# Patient Record
Sex: FEMALE | Race: WHITE
[De-identification: names, ages, dates, MRNs, and addresses within clinical notes are randomized per-mention and may not be internally consistent; named-entity substitution may affect disease eponyms.]

---

## 2018-04-03 ENCOUNTER — HOSPITAL ENCOUNTER (EMERGENCY)
Dept: HOSPITAL 45 - C.EDB | Age: 18
Discharge: HOME | End: 2018-04-03
Payer: COMMERCIAL

## 2018-04-03 VITALS
WEIGHT: 106.7 LBS | BODY MASS INDEX: 18.91 KG/M2 | HEIGHT: 62.99 IN | WEIGHT: 106.7 LBS | HEIGHT: 62.99 IN | BODY MASS INDEX: 18.91 KG/M2

## 2018-04-03 VITALS — TEMPERATURE: 97.34 F

## 2018-04-03 VITALS — OXYGEN SATURATION: 98 % | HEART RATE: 78 BPM | SYSTOLIC BLOOD PRESSURE: 110 MMHG | DIASTOLIC BLOOD PRESSURE: 64 MMHG

## 2018-04-03 DIAGNOSIS — R11.2: ICD-10-CM

## 2018-04-03 DIAGNOSIS — R10.13: Primary | ICD-10-CM

## 2018-04-03 LAB
ALBUMIN SERPL-MCNC: 4.1 GM/DL (ref 3.4–5)
ALP SERPL-CCNC: 58 U/L (ref 45–117)
ALT SERPL-CCNC: 22 U/L (ref 12–78)
AST SERPL-CCNC: 13 U/L (ref 15–37)
BASOPHILS # BLD: 0.03 K/UL (ref 0–0.2)
BASOPHILS NFR BLD: 0.3 %
BUN SERPL-MCNC: 9 MG/DL (ref 7–18)
CALCIUM SERPL-MCNC: 9.3 MG/DL (ref 8.5–10.1)
CO2 SERPL-SCNC: 24 MMOL/L (ref 21–32)
CREAT SERPL-MCNC: 0.83 MG/DL (ref 0.6–1.2)
EOS ABS #: 0.29 K/UL (ref 0–0.5)
EOSINOPHIL NFR BLD AUTO: 237 K/UL (ref 130–400)
GLUCOSE SERPL-MCNC: 96 MG/DL (ref 70–99)
HCT VFR BLD CALC: 43.5 % (ref 37–47)
HGB BLD-MCNC: 15.2 G/DL (ref 12–16)
IG#: 0.02 K/UL (ref 0–0.02)
IMM GRANULOCYTES NFR BLD AUTO: 28.9 %
LIPASE: 157 U/L (ref 73–393)
LYMPHOCYTES # BLD: 3.09 K/UL (ref 1.2–3.4)
MCH RBC QN AUTO: 31.3 PG (ref 25–34)
MCHC RBC AUTO-ENTMCNC: 34.9 G/DL (ref 32–36)
MCV RBC AUTO: 89.7 FL (ref 80–100)
MONO ABS #: 0.75 K/UL (ref 0.11–0.59)
MONOCYTES NFR BLD: 7 %
NEUT ABS #: 6.53 K/UL (ref 1.4–6.5)
NEUTROPHILS # BLD AUTO: 2.7 %
NEUTROPHILS NFR BLD AUTO: 60.9 %
PMV BLD AUTO: 10.4 FL (ref 7.4–10.4)
POTASSIUM SERPL-SCNC: 4.1 MMOL/L (ref 3.5–5.1)
PROT SERPL-MCNC: 8.2 GM/DL (ref 6.4–8.2)
RED CELL DISTRIBUTION WIDTH CV: 12.4 % (ref 11.5–14.5)
RED CELL DISTRIBUTION WIDTH SD: 40.5 FL (ref 36.4–46.3)
SODIUM SERPL-SCNC: 138 MMOL/L (ref 136–145)
WBC # BLD AUTO: 10.71 K/UL (ref 4.8–10.8)

## 2018-04-03 NOTE — EMERGENCY ROOM VISIT NOTE
History


Report prepared by Елена:  Melva Garcia


Under the Supervision of:  Dr. Isaac Harley M.D.


First contact with patient:  04:12


Chief Complaint:  ABDOMINAL PAIN


Stated Complaint:  STOMACH PAIN,FEVERISH,CHILLS,VOMITING





History of Present Illness


The patient is a 17 year old female who presents to the Emergency Room with 

complaints of worsening abdominal pain starting a few days ago. The patient 

states that she vomited tonight and became diaphoretic after. The patient 

complains of feeling feverish, chills, loss of appetite, some burning with 

urination, constipation, and diarrhea. She states that when she can use the 

restroom, it is very minimal diarrhea. She reports that she took a laxative 

with no relief. The patient denies hematochezia, rash, nausea, a history of 

abdominal surgeries, concern for STDs, and the chance of pregnancy. She notes 

that her parents don't know she is here, but she has tried calling them 

multiple times. The patient currently rates her pain as a 1/10 in severity.





   Source of History:  patient


   Onset:  a few days ago


   Position:  abdomen


   Symptom Intensity:  1/10


   Timing:  worsening


   Associated Symptoms:  + chills, + diaphoresis, + vomiting, + diarrhea, + 

urinary symptoms, No nausea, No hematochezia, No rash


Note:


The patient complains of feeling feverish, loss of appetite, and constipation.





Review of Systems


See HPI for pertinent positives & negatives. A total of 10 systems reviewed and 

were otherwise negative.





Past Medical & Surgical


Medical Problems:


(1) No Known Active Medical Problems








Family History





Patient reports no known family medical history.





Social History


Smoking Status:  Never Smoker


Marital Status:  single


Housing Status:  lives with roommate


Occupation Status:  [employer]





Current/Historical Medications


Scheduled


Birth Control Pills (Birth Control Pills), 1 TAB PO DAILY


Famotidine (Pepcid), 20 MG PO AS DIRECTED





Allergies


Coded Allergies:  


     No Known Allergies (Unverified , 4/3/18)





Physical Exam


Vital Signs











  Date Time  Temp Pulse Resp B/P (MAP) Pulse Ox O2 Delivery O2 Flow Rate FiO2


 


4/3/18 05:55  71 18 110/74 100 Room Air  


 


4/3/18 04:08 36.3 89 18 111/74 98 Room Air  











Physical Exam


GENERAL: Patient is mildly anxious appearing and in no acute distress.


EYES: No scleral icterus, unremarkable pupils.


ENT: Mucous membranes dry, no nasal congestion.


NECK: No masses appreciated, no meningismus, trachea is midline.


RESPIRATORY: No dyspnea. Clear to auscultation and equal bilaterally. No wheeze

, no rhonchi.


CARDIOVASCULAR: Regular rate and rhythm.  No murmurs, rubs, gallops appreciated.


GASTROINTESTINAL: Abdomen soft, nontender, no peritonitis.  Bowel sounds 

positive.  No masses appreciated.


BACK: No midline tenderness, no CVA tenderness


EXTREMITIES: Normal motion all extremities, no cyanosis, no edema.


NEUROLOGIC: Alert and oriented, no acute motor or sensory deficits, no focal 

weakness, cranial nerves grossly intact.


SKIN: No rash, no jaundice, no diaphoresis.





Medical Decision & Procedures


ER Provider


Diagnostic Interpretation:


KUB: The results were interpreted by me. Nonspecific bowel gas pattern. No 

obstruction appreciated. Stool continues through to rectum.





Laboratory Results


4/3/18 04:20








Red Blood Count 4.85, Mean Corpuscular Volume 89.7, Mean Corpuscular Hemoglobin 

31.3, Mean Corpuscular Hemoglobin Concent 34.9, Mean Platelet Volume 10.4, 

Neutrophils (%) (Auto) 60.9, Lymphocytes (%) (Auto) 28.9, Monocytes (%) (Auto) 

7.0, Eosinophils (%) (Auto) 2.7, Basophils (%) (Auto) 0.3, Neutrophils # (Auto) 

6.53, Lymphocytes # (Auto) 3.09, Monocytes # (Auto) 0.75, Eosinophils # (Auto) 

0.29, Basophils # (Auto) 0.03





4/3/18 04:20

















Test


  4/3/18


04:20 4/3/18


05:05


 


White Blood Count


  10.71 K/uL


(4.8-10.8) 


 


 


Red Blood Count


  4.85 M/uL


(4.2-5.4) 


 


 


Hemoglobin


  15.2 g/dL


(12.0-16.0) 


 


 


Hematocrit 43.5 % (37-47)  


 


Mean Corpuscular Volume


  89.7 fL


() 


 


 


Mean Corpuscular Hemoglobin


  31.3 pg


(25-34) 


 


 


Mean Corpuscular Hemoglobin


Concent 34.9 g/dl


(32-36) 


 


 


Platelet Count


  237 K/uL


(130-400) 


 


 


Mean Platelet Volume


  10.4 fL


(7.4-10.4) 


 


 


Neutrophils (%) (Auto) 60.9 %  


 


Lymphocytes (%) (Auto) 28.9 %  


 


Monocytes (%) (Auto) 7.0 %  


 


Eosinophils (%) (Auto) 2.7 %  


 


Basophils (%) (Auto) 0.3 %  


 


Neutrophils # (Auto)


  6.53 K/uL


(1.4-6.5) 


 


 


Lymphocytes # (Auto)


  3.09 K/uL


(1.2-3.4) 


 


 


Monocytes # (Auto)


  0.75 K/uL


(0.11-0.59) 


 


 


Eosinophils # (Auto)


  0.29 K/uL


(0-0.5) 


 


 


Basophils # (Auto)


  0.03 K/uL


(0-0.2) 


 


 


RDW Standard Deviation


  40.5 fL


(36.4-46.3) 


 


 


RDW Coefficient of Variation


  12.4 %


(11.5-14.5) 


 


 


Immature Granulocyte % (Auto) 0.2 %  


 


Immature Granulocyte # (Auto)


  0.02 K/uL


(0.00-0.02) 


 


 


Anion Gap


  9.0 mmol/L


(3-11) 


 


 


Est Creatinine Clear Calc


Drug Dose 83.3 ml/min 


  


 


 


Estimated GFR (


American) 118.5 


  


 


 


Estimated GFR (Non-


American 102.2 


  


 


 


BUN/Creatinine Ratio 11.1 (10-20)  


 


Calcium Level


  9.3 mg/dl


(8.5-10.1) 


 


 


Total Bilirubin


  0.5 mg/dl


(0.2-1) 


 


 


Direct Bilirubin


  0.2 mg/dl


(0-0.2) 


 


 


Aspartate Amino Transf


(AST/SGOT) 13 U/L (15-37) 


  


 


 


Alanine Aminotransferase


(ALT/SGPT) 22 U/L (12-78) 


  


 


 


Alkaline Phosphatase


  58 U/L


() 


 


 


Total Protein


  8.2 gm/dl


(6.4-8.2) 


 


 


Albumin


  4.1 gm/dl


(3.4-5.0) 


 


 


Lipase


  157 U/L


() 


 


 


Urine Color  YELLOW 


 


Urine Appearance  CLOUDY (CLEAR) 


 


Urine pH  5.0 (4.5-7.5) 


 


Urine Specific Gravity


  


  1.028


(1.000-1.030)


 


Urine Protein  NEG (NEG) 


 


Urine Glucose (UA)  NEG (NEG) 


 


Urine Ketones  NEG (NEG) 


 


Urine Occult Blood  1+ (NEG) 


 


Urine Nitrite  NEG (NEG) 


 


Urine Bilirubin  NEG (NEG) 


 


Urine Urobilinogen  NEG (NEG) 


 


Urine Leukocyte Esterase  TRACE (NEG) 


 


Urine WBC (Auto)


  


  10-30 /hpf


(0-5)


 


Urine RBC (Auto)  0-4 /hpf (0-4) 


 


Urine Hyaline Casts (Auto)


  


  5-10 /lpf


(0-5)


 


Urine Epithelial Cells (Auto)  >30 /lpf (0-5) 


 


Urine Bacteria (Auto)  2+ (NEG) 


 


Urine Pregnancy Test  NEG (NEG) 





Laboratory results as reviewed by me.





Medications Administered











 Medications


  (Trade)  Dose


 Ordered  Sig/Edvin


 Route  Start Time


 Stop Time Status Last Admin


Dose Admin


 


 Sodium Chloride  1,000 ml @ 


 999 mls/hr  Q1H1M STAT


 IV  4/3/18 04:21


 4/3/18 05:21 DC 4/3/18 04:33


999 MLS/HR


 


 Ranitidine HCl


  (zANTac SYRUP)  150 mg  NOW  ONCE


 PO  4/3/18 05:45


 4/3/18 05:46 DC 4/3/18 05:53


150 MG


 


 Lidocaine HCl


  (Viscous


 Lidocaine 2% Soln)  20 ml  STK-MED ONCE


 .ROUTE  4/3/18 05:51


 4/3/18 05:52 DC 4/3/18 05:53


20 ML


 


 Al Hydroxide/Mg


 Hydroxide


  (Maalox Susp)  30 ml  STK-MED ONCE


 .ROUTE  4/3/18 05:51


 4/3/18 05:52 DC 4/3/18 05:53


30 ML


 


 Ondansetron HCl


  (ZOFRAN ODT 4MG


 Home Pack)  1 homepack  UD  ONCE


 PO  4/3/18 06:45


 4/3/18 06:46 DC 4/3/18 06:41


1 HOMEPACK











ED Course


0417: The patient was evaluated in room A10. A complete history and physical 

exam was performed.





0421: Ordered NSS 1000 ml @ 999 mls/hr IV.





Medical Decision


Differential: Gastroenteritis, Food Borne, Esophageal Perforation, Pregnancy, 

Electrolyte Abnormality, Dehydration, Intraabdominal Infection, UTI/

Pyelonephritis, Bowel Obstruction, Biliary Pathology, amongst other pathology 

entertained.





17 yr old female with vague complaints of abdominal pain.  After several 

discussions with his seems like primary issue is epigastric discomfort, gnawing 

and coming in waves.  This leads to nausea and even vomited yesterday.  Notes 

decreased BMs as well.  Suspect much of this is gastritis as admits increased 

stress, poor appetite with irregular meals, recent change in BC.  She has 

unremarkable KUB, normal labs and completely benign abdominal exam on repeat 

exams.  She is not septic and does not have surgical abdomen.  Symptoms and 

labs are not consistent with hepatic/portal vein thrombosis.  Exam is not 

consistent with cholecystitis.  She has no TTP in RLQ (nor elsewhere for that 

matter).  She is not pregnant.  UA with 2+ bacteria but with 30+ epis and no 

UTI symptoms I do not feel this is UTI and will await culture.  She looks well 

but is clearly a bit on the anxious side.  Initially when going to discharge 

her she noted pain coming back and thus given GI Cocktail with resolution.  

Will send home with nausea meds and advised Pepcid (or other anti-acid) for 

next week, better eating, and if worsening/other concerns RTED for further 

evaluation.





Medication Reconcilliation


Current Medication List:  was personally reviewed by me





Impression





 Primary Impression:  


 Epigastric abdominal pain


 Additional Impression:  


 Nausea & vomiting





Scribe Attestation


The scribe's documentation has been prepared under my direction and personally 

reviewed by me in its entirety. I confirm that the note above accurately 

reflects all work, treatment, procedures, and medical decision making performed 

by me.





Departure Information


Dispostion


Home / Self-Care





Prescriptions





Famotidine (Pepcid) 20 Mg Tab


20 MG PO AS DIRECTED, #30 TAB


   Take twice daily for 5 days and then once daily.


   Prov: Isaac Harley M.D.         4/3/18





Patient Instructions


ED Epigastric Pain JESE, My [facility]





Problem Qualifiers

## 2018-04-03 NOTE — DIAGNOSTIC IMAGING REPORT
KUB



CLINICAL HISTORY: 17 years-old Female presenting with constipation. 



TECHNIQUE: Single supine view of the abdomen was obtained.



COMPARISON: None.



FINDINGS:

Moderate stool burden in the distal descending colon and rectum. Paucity of

small bowel gas, nonspecific. No gross evidence of bowel obstruction.. No gross

pneumoperitoneum.



Allowing for bowel gas and stool, no calcifications to suggest nephrolithiasis.



Osseous structures normal. Lung bases clear. 



IMPRESSION:

1.  Moderate stool burden in the distal descending colon and rectum. No bowel

obstruction.







Electronically signed by:  Dean Contreras M.D.

4/3/2018 7:39 AM



Dictated Date/Time:  4/3/2018 7:38 AM

## 2023-10-09 ENCOUNTER — APPOINTMENT (RX ONLY)
Dept: URBAN - METROPOLITAN AREA CLINIC 10 | Facility: CLINIC | Age: 23
Setting detail: DERMATOLOGY
End: 2023-10-09

## 2023-10-09 DIAGNOSIS — D22 MELANOCYTIC NEVI: ICD-10-CM | Status: STABLE

## 2023-10-09 DIAGNOSIS — L30.9 DERMATITIS, UNSPECIFIED: ICD-10-CM | Status: INADEQUATELY CONTROLLED

## 2023-10-09 DIAGNOSIS — L57.8 OTHER SKIN CHANGES DUE TO CHRONIC EXPOSURE TO NONIONIZING RADIATION: ICD-10-CM | Status: STABLE

## 2023-10-09 DIAGNOSIS — D18.0 HEMANGIOMA: ICD-10-CM | Status: STABLE

## 2023-10-09 DIAGNOSIS — L81.4 OTHER MELANIN HYPERPIGMENTATION: ICD-10-CM | Status: STABLE

## 2023-10-09 PROBLEM — D22.5 MELANOCYTIC NEVI OF TRUNK: Status: ACTIVE | Noted: 2023-10-09

## 2023-10-09 PROBLEM — D18.01 HEMANGIOMA OF SKIN AND SUBCUTANEOUS TISSUE: Status: ACTIVE | Noted: 2023-10-09

## 2023-10-09 PROCEDURE — ? PRESCRIPTION MEDICATION MANAGEMENT

## 2023-10-09 PROCEDURE — ? PRESCRIPTION

## 2023-10-09 PROCEDURE — 99203 OFFICE O/P NEW LOW 30 MIN: CPT

## 2023-10-09 PROCEDURE — ? ADDITIONAL NOTES

## 2023-10-09 PROCEDURE — ? SUNSCREEN RECOMMENDATIONS

## 2023-10-09 PROCEDURE — ? SUNSCREEN TREATMENT REGIMEN

## 2023-10-09 PROCEDURE — ? FULL BODY SKIN EXAM

## 2023-10-09 PROCEDURE — ? COUNSELING

## 2023-10-09 RX ORDER — HYDROCORTISONE 25 MG/G
2.5% OINTMENT TOPICAL BID
Qty: 28.35 | Refills: 2 | Status: ERX | COMMUNITY
Start: 2023-10-09

## 2023-10-09 RX ADMIN — HYDROCORTISONE 2.5%: 25 OINTMENT TOPICAL at 00:00

## 2023-10-09 ASSESSMENT — LOCATION ZONE DERM
LOCATION ZONE: NECK
LOCATION ZONE: VULVA
LOCATION ZONE: ARM
LOCATION ZONE: TRUNK

## 2023-10-09 ASSESSMENT — LOCATION DETAILED DESCRIPTION DERM
LOCATION DETAILED: LEFT INFERIOR ANTERIOR NECK
LOCATION DETAILED: RIGHT SUPERIOR LATERAL UPPER BACK
LOCATION DETAILED: LEFT LATERAL ABDOMEN
LOCATION DETAILED: RIGHT POSTERIOR SHOULDER
LOCATION DETAILED: LEFT LABIA MAJORA

## 2023-10-09 ASSESSMENT — LOCATION SIMPLE DESCRIPTION DERM
LOCATION SIMPLE: ABDOMEN
LOCATION SIMPLE: VULVA
LOCATION SIMPLE: RIGHT BACK
LOCATION SIMPLE: RIGHT SHOULDER
LOCATION SIMPLE: LEFT ANTERIOR NECK

## 2023-10-09 ASSESSMENT — SEVERITY ASSESSMENT: SEVERITY: MILD

## 2023-10-09 NOTE — PROCEDURE: MIPS QUALITY
Quality 431: Preventive Care And Screening: Unhealthy Alcohol Use - Screening: Patient not identified as an unhealthy alcohol user when screened for unhealthy alcohol use using a systematic screening method
Quality 130: Documentation Of Current Medications In The Medical Record: Current Medications Documented
Quality 110: Preventive Care And Screening: Influenza Immunization: Influenza Immunization previously received during influenza season
Quality 226: Preventive Care And Screening: Tobacco Use: Screening And Cessation Intervention: Patient screened for tobacco use and is an ex/non-smoker
Quality 134: Screening For Clinical Depression And Follow-Up Plan: The patient was screened for depression and the screen was negative and no follow up required
Detail Level: Detailed

## 2025-01-20 ENCOUNTER — APPOINTMENT (OUTPATIENT)
Dept: URBAN - METROPOLITAN AREA CLINIC 9 | Facility: CLINIC | Age: 25
Setting detail: DERMATOLOGY
End: 2025-01-20

## 2025-01-20 DIAGNOSIS — I78.8 OTHER DISEASES OF CAPILLARIES: ICD-10-CM

## 2025-01-20 DIAGNOSIS — L57.8 OTHER SKIN CHANGES DUE TO CHRONIC EXPOSURE TO NONIONIZING RADIATION: ICD-10-CM | Status: STABLE

## 2025-01-20 DIAGNOSIS — L81.4 OTHER MELANIN HYPERPIGMENTATION: ICD-10-CM | Status: STABLE

## 2025-01-20 DIAGNOSIS — L81.0 POSTINFLAMMATORY HYPERPIGMENTATION: ICD-10-CM | Status: STABLE

## 2025-01-20 DIAGNOSIS — D18.0 HEMANGIOMA: ICD-10-CM | Status: STABLE

## 2025-01-20 DIAGNOSIS — L70.0 ACNE VULGARIS: ICD-10-CM | Status: INADEQUATELY CONTROLLED

## 2025-01-20 DIAGNOSIS — L74.51 PRIMARY FOCAL HYPERHIDROSIS: ICD-10-CM | Status: INADEQUATELY CONTROLLED

## 2025-01-20 DIAGNOSIS — D22 MELANOCYTIC NEVI: ICD-10-CM | Status: STABLE

## 2025-01-20 PROBLEM — D18.01 HEMANGIOMA OF SKIN AND SUBCUTANEOUS TISSUE: Status: ACTIVE | Noted: 2025-01-20

## 2025-01-20 PROBLEM — D22.5 MELANOCYTIC NEVI OF TRUNK: Status: ACTIVE | Noted: 2025-01-20

## 2025-01-20 PROBLEM — L74.510 PRIMARY FOCAL HYPERHIDROSIS, AXILLA: Status: ACTIVE | Noted: 2025-01-20

## 2025-01-20 PROCEDURE — ? FULL BODY SKIN EXAM

## 2025-01-20 PROCEDURE — ? ADDITIONAL NOTES

## 2025-01-20 PROCEDURE — ? COUNSELING

## 2025-01-20 PROCEDURE — ? SUNSCREEN RECOMMENDATIONS

## 2025-01-20 PROCEDURE — ? PRESCRIPTION MEDICATION MANAGEMENT

## 2025-01-20 PROCEDURE — ? PRESCRIPTION

## 2025-01-20 PROCEDURE — 99214 OFFICE O/P EST MOD 30 MIN: CPT

## 2025-01-20 PROCEDURE — ? SUNSCREEN TREATMENT REGIMEN

## 2025-01-20 RX ORDER — CLINDAMYCIN PHOSPHATE AND TRETINOIN 12; .25 MG/G; MG/G
GEL TOPICAL
Qty: 30 | Refills: 2 | Status: ERX | COMMUNITY
Start: 2025-01-20

## 2025-01-20 RX ORDER — GLYCOPYRRONIUM 2.4 G/100G
CLOTH TOPICAL
Qty: 30 | Refills: 3 | Status: ERX | COMMUNITY
Start: 2025-01-20

## 2025-01-20 RX ADMIN — CLINDAMYCIN PHOSPHATE AND TRETINOIN: 12; .25 GEL TOPICAL at 00:00

## 2025-01-20 RX ADMIN — GLYCOPYRRONIUM: 2.4 CLOTH TOPICAL at 00:00

## 2025-01-20 ASSESSMENT — LOCATION SIMPLE DESCRIPTION DERM
LOCATION SIMPLE: RIGHT FOREARM
LOCATION SIMPLE: LEFT CHEEK
LOCATION SIMPLE: LEFT AXILLARY VAULT
LOCATION SIMPLE: RIGHT BACK
LOCATION SIMPLE: RIGHT CHEEK
LOCATION SIMPLE: RIGHT SHOULDER
LOCATION SIMPLE: ABDOMEN
LOCATION SIMPLE: LEFT ANTERIOR NECK
LOCATION SIMPLE: RIGHT AXILLARY VAULT

## 2025-01-20 ASSESSMENT — LOCATION DETAILED DESCRIPTION DERM
LOCATION DETAILED: RIGHT SUPERIOR LATERAL UPPER BACK
LOCATION DETAILED: RIGHT POSTERIOR SHOULDER
LOCATION DETAILED: RIGHT AXILLARY VAULT
LOCATION DETAILED: LEFT MEDIAL MALAR CHEEK
LOCATION DETAILED: LEFT INFERIOR ANTERIOR NECK
LOCATION DETAILED: LEFT INFERIOR CENTRAL MALAR CHEEK
LOCATION DETAILED: RIGHT CENTRAL MALAR CHEEK
LOCATION DETAILED: LEFT LATERAL ABDOMEN
LOCATION DETAILED: LEFT AXILLARY VAULT
LOCATION DETAILED: RIGHT VENTRAL PROXIMAL FOREARM

## 2025-01-20 ASSESSMENT — LOCATION ZONE DERM
LOCATION ZONE: AXILLAE
LOCATION ZONE: NECK
LOCATION ZONE: FACE
LOCATION ZONE: TRUNK
LOCATION ZONE: ARM

## 2025-01-20 NOTE — PROCEDURE: PRESCRIPTION MEDICATION MANAGEMENT
Initiate Treatment: Qbrexza wipes
Detail Level: Zone
Render In Strict Bullet Format?: No
Initiate Treatment: Tretinion clindamycin combo

## 2025-01-20 NOTE — PROCEDURE: MIPS QUALITY
Quality 130: Documentation Of Current Medications In The Medical Record: Current Medications Documented
Quality 226: Preventive Care And Screening: Tobacco Use: Screening And Cessation Intervention: Patient screened for tobacco use and is an ex/non-smoker
Quality 431: Preventive Care And Screening: Unhealthy Alcohol Use - Screening: Patient not identified as an unhealthy alcohol user when screened for unhealthy alcohol use using a systematic screening method
Detail Level: Detailed
Quality 110: Preventive Care And Screening: Influenza Immunization: Influenza Immunization previously received during influenza season
Quality 134: Screening For Clinical Depression And Follow-Up Plan: The patient was screened for depression and the screen was negative and no follow up required

## 2025-01-20 NOTE — PROCEDURE: COUNSELING
Detail Level: Generalized
Detail Level: Detailed
Medical Necessity Information: LCD Guidelines vary from payer to payer. Please check with your payer's policy to determine medical necessity.
Medical Necessity Clause: Botulinum toxin hyperhidrosis therapy is medically necessary because
Detail Level: Zone
Sarecycline Counseling: Patient advised regarding possible photosensitivity and discoloration of the teeth, skin, lips, tongue and gums.  Patient instructed to avoid sunlight, if possible.  When exposed to sunlight, patients should wear protective clothing, sunglasses, and sunscreen.  The patient was instructed to call the office immediately if the following severe adverse effects occur:  hearing changes, easy bruising/bleeding, severe headache, or vision changes.  The patient verbalized understanding of the proper use and possible adverse effects of sarecycline.  All of the patient's questions and concerns were addressed.
Dapsone Counseling: I discussed with the patient the risks of dapsone including but not limited to hemolytic anemia, agranulocytosis, rashes, methemoglobinemia, kidney failure, peripheral neuropathy, headaches, GI upset, and liver toxicity.  Patients who start dapsone require monitoring including baseline LFTs and weekly CBCs for the first month, then every month thereafter.  The patient verbalized understanding of the proper use and possible adverse effects of dapsone.  All of the patient's questions and concerns were addressed.
Include Pregnancy/Lactation Warning?: No
Bactrim Pregnancy And Lactation Text: This medication is Pregnancy Category D and is known to cause fetal risk.  It is also excreted in breast milk.
Azithromycin Pregnancy And Lactation Text: This medication is considered safe during pregnancy and is also secreted in breast milk.
Erythromycin Pregnancy And Lactation Text: This medication is Pregnancy Category B and is considered safe during pregnancy. It is also excreted in breast milk.
Isotretinoin Pregnancy And Lactation Text: This medication is Pregnancy Category X and is considered extremely dangerous during pregnancy. It is unknown if it is excreted in breast milk.
Benzoyl Peroxide Pregnancy And Lactation Text: This medication is Pregnancy Category C. It is unknown if benzoyl peroxide is excreted in breast milk.
Birth Control Pills Pregnancy And Lactation Text: This medication should be avoided if pregnant and for the first 30 days post-partum.
Spironolactone Pregnancy And Lactation Text: This medication can cause feminization of the male fetus and should be avoided during pregnancy. The active metabolite is also found in breast milk.
High Dose Vitamin A Counseling: Side effects reviewed, pt to contact office should one occur.
Tazorac Counseling:  Patient advised that medication is irritating and drying.  Patient may need to apply sparingly and wash off after an hour before eventually leaving it on overnight.  The patient verbalized understanding of the proper use and possible adverse effects of tazorac.  All of the patient's questions and concerns were addressed.
Topical Sulfur Applications Counseling: Topical Sulfur Counseling: Patient counseled that this medication may cause skin irritation or allergic reactions.  In the event of skin irritation, the patient was advised to reduce the amount of the drug applied or use it less frequently.   The patient verbalized understanding of the proper use and possible adverse effects of topical sulfur application.  All of the patient's questions and concerns were addressed.
Tetracycline Pregnancy And Lactation Text: This medication is Pregnancy Category D and not consider safe during pregnancy. It is also excreted in breast milk.
Doxycycline Pregnancy And Lactation Text: This medication is Pregnancy Category D and not consider safe during pregnancy. It is also excreted in breast milk but is considered safe for shorter treatment courses.
Topical Clindamycin Counseling: Patient counseled that this medication may cause skin irritation or allergic reactions.  In the event of skin irritation, the patient was advised to reduce the amount of the drug applied or use it less frequently.   The patient verbalized understanding of the proper use and possible adverse effects of clindamycin.  All of the patient's questions and concerns were addressed.
Minocycline Counseling: Patient advised regarding possible photosensitivity and discoloration of the teeth, skin, lips, tongue and gums.  Patient instructed to avoid sunlight, if possible.  When exposed to sunlight, patients should wear protective clothing, sunglasses, and sunscreen.  The patient was instructed to call the office immediately if the following severe adverse effects occur:  hearing changes, easy bruising/bleeding, severe headache, or vision changes.  The patient verbalized understanding of the proper use and possible adverse effects of minocycline.  All of the patient's questions and concerns were addressed.
Isotretinoin Counseling: Patient should get monthly blood tests, not donate blood, not drive at night if vision affected, not share medication, and not undergo elective surgery for 6 months after tx completed. Side effects reviewed, pt to contact office should one occur.
Azithromycin Counseling:  I discussed with the patient the risks of azithromycin including but not limited to GI upset, allergic reaction, drug rash, diarrhea, and yeast infections.
Benzoyl Peroxide Counseling: Patient counseled that medicine may cause skin irritation and bleach clothing.  In the event of skin irritation, the patient was advised to reduce the amount of the drug applied or use it less frequently.   The patient verbalized understanding of the proper use and possible adverse effects of benzoyl peroxide.  All of the patient's questions and concerns were addressed.
Erythromycin Counseling:  I discussed with the patient the risks of erythromycin including but not limited to GI upset, allergic reaction, drug rash, diarrhea, increase in liver enzymes, and yeast infections.
Dapsone Pregnancy And Lactation Text: This medication is Pregnancy Category C and is not considered safe during pregnancy or breast feeding.
Topical Retinoid counseling:  Patient advised to apply a pea-sized amount only at bedtime and wait 30 minutes after washing their face before applying.  If too drying, patient may add a non-comedogenic moisturizer. The patient verbalized understanding of the proper use and possible adverse effects of retinoids.  All of the patient's questions and concerns were addressed.
Doxycycline Counseling:  Patient counseled regarding possible photosensitivity and increased risk for sunburn.  Patient instructed to avoid sunlight, if possible.  When exposed to sunlight, patients should wear protective clothing, sunglasses, and sunscreen.  The patient was instructed to call the office immediately if the following severe adverse effects occur:  hearing changes, easy bruising/bleeding, severe headache, or vision changes.  The patient verbalized understanding of the proper use and possible adverse effects of doxycycline.  All of the patient's questions and concerns were addressed.
Spironolactone Counseling: Patient advised regarding risks of diarrhea, abdominal pain, hyperkalemia, birth defects (for female patients), liver toxicity and renal toxicity. The patient may need blood work to monitor liver and kidney function and potassium levels while on therapy. The patient verbalized understanding of the proper use and possible adverse effects of spironolactone.  All of the patient's questions and concerns were addressed.
Topical Retinoid Pregnancy And Lactation Text: This medication is Pregnancy Category C. It is unknown if this medication is excreted in breast milk.
Topical Sulfur Applications Pregnancy And Lactation Text: This medication is Pregnancy Category C and has an unknown safety profile during pregnancy. It is unknown if this topical medication is excreted in breast milk.
Bactrim Counseling:  I discussed with the patient the risks of sulfa antibiotics including but not limited to GI upset, allergic reaction, drug rash, diarrhea, dizziness, photosensitivity, and yeast infections.  Rarely, more serious reactions can occur including but not limited to aplastic anemia, agranulocytosis, methemoglobinemia, blood dyscrasias, liver or kidney failure, lung infiltrates or desquamative/blistering drug rashes.
Birth Control Pills Counseling: Birth Control Pill Counseling: I discussed with the patient the potential side effects of OCPs including but not limited to increased risk of stroke, heart attack, thrombophlebitis, deep venous thrombosis, hepatic adenomas, breast changes, GI upset, headaches, and depression.  The patient verbalized understanding of the proper use and possible adverse effects of OCPs. All of the patient's questions and concerns were addressed.
Tetracycline Counseling: Patient counseled regarding possible photosensitivity and increased risk for sunburn.  Patient instructed to avoid sunlight, if possible.  When exposed to sunlight, patients should wear protective clothing, sunglasses, and sunscreen.  The patient was instructed to call the office immediately if the following severe adverse effects occur:  hearing changes, easy bruising/bleeding, severe headache, or vision changes.  The patient verbalized understanding of the proper use and possible adverse effects of tetracycline.  All of the patient's questions and concerns were addressed. Patient understands to avoid pregnancy while on therapy due to potential birth defects.
Topical Clindamycin Pregnancy And Lactation Text: This medication is Pregnancy Category B and is considered safe during pregnancy. It is unknown if it is excreted in breast milk.
Tazorac Pregnancy And Lactation Text: This medication is not safe during pregnancy. It is unknown if this medication is excreted in breast milk.
High Dose Vitamin A Pregnancy And Lactation Text: High dose vitamin A therapy is contraindicated during pregnancy and breast feeding.
Azelaic Acid Counseling: Patient counseled that medicine may cause skin irritation and to avoid applying near the eyes.  In the event of skin irritation, the patient was advised to reduce the amount of the drug applied or use it less frequently.   The patient verbalized understanding of the proper use and possible adverse effects of azelaic acid.  All of the patient's questions and concerns were addressed.
Aklief counseling:  Patient advised to apply a pea-sized amount only at bedtime and wait 30 minutes after washing their face before applying.  If too drying, patient may add a non-comedogenic moisturizer.  The most commonly reported side effects including irritation, redness, scaling, dryness, stinging, burning, itching, and increased risk of sunburn.  The patient verbalized understanding of the proper use and possible adverse effects of retinoids.  All of the patient's questions and concerns were addressed.
Winlevi Counseling:  I discussed with the patient the risks of topical clascoterone including but not limited to erythema, scaling, itching, and stinging. Patient voiced their understanding.
Winlevi Pregnancy And Lactation Text: This medication is considered safe during pregnancy and breastfeeding.
Aklief Pregnancy And Lactation Text: It is unknown if this medication is safe to use during pregnancy.  It is unknown if this medication is excreted in breast milk.  Breastfeeding women should use the topical cream on the smallest area of the skin for the shortest time needed while breastfeeding.  Do not apply to nipple and areola.
Azelaic Acid Pregnancy And Lactation Text: This medication is considered safe during pregnancy and breast feeding.

## 2025-05-12 ENCOUNTER — APPOINTMENT (OUTPATIENT)
Dept: URBAN - METROPOLITAN AREA CLINIC 9 | Facility: CLINIC | Age: 25
Setting detail: DERMATOLOGY
End: 2025-05-12

## 2025-05-12 DIAGNOSIS — L57.8 OTHER SKIN CHANGES DUE TO CHRONIC EXPOSURE TO NONIONIZING RADIATION: ICD-10-CM | Status: STABLE

## 2025-05-12 DIAGNOSIS — D22 MELANOCYTIC NEVI: ICD-10-CM | Status: STABLE

## 2025-05-12 DIAGNOSIS — B07.8 OTHER VIRAL WARTS: ICD-10-CM | Status: INADEQUATELY CONTROLLED

## 2025-05-12 DIAGNOSIS — L91.8 OTHER HYPERTROPHIC DISORDERS OF THE SKIN: ICD-10-CM | Status: STABLE

## 2025-05-12 PROBLEM — D22.61 MELANOCYTIC NEVI OF RIGHT UPPER LIMB, INCLUDING SHOULDER: Status: ACTIVE | Noted: 2025-05-12

## 2025-05-12 PROBLEM — D23.39 OTHER BENIGN NEOPLASM OF SKIN OF OTHER PARTS OF FACE: Status: ACTIVE | Noted: 2025-05-12

## 2025-05-12 PROCEDURE — ? COUNSELING

## 2025-05-12 PROCEDURE — 17110 DESTRUCTION B9 LES UP TO 14: CPT

## 2025-05-12 PROCEDURE — 99213 OFFICE O/P EST LOW 20 MIN: CPT | Mod: 25

## 2025-05-12 PROCEDURE — ? LIQUID NITROGEN

## 2025-05-12 PROCEDURE — ? FULL BODY SKIN EXAM - DECLINED

## 2025-05-12 PROCEDURE — ? TREATMENT REGIMEN

## 2025-05-12 ASSESSMENT — LOCATION DETAILED DESCRIPTION DERM
LOCATION DETAILED: LEFT AXILLARY VAULT
LOCATION DETAILED: LEFT RIB CAGE
LOCATION DETAILED: RIGHT INFERIOR MEDIAL FOREHEAD
LOCATION DETAILED: RIGHT MIDDLE FINGER PROXIMAL INTERPHALANGEAL JOINT
LOCATION DETAILED: RIGHT ELBOW

## 2025-05-12 ASSESSMENT — LOCATION ZONE DERM
LOCATION ZONE: TRUNK
LOCATION ZONE: AXILLAE
LOCATION ZONE: ARM
LOCATION ZONE: FACE
LOCATION ZONE: FINGER

## 2025-05-12 ASSESSMENT — LOCATION SIMPLE DESCRIPTION DERM
LOCATION SIMPLE: ABDOMEN
LOCATION SIMPLE: LEFT AXILLARY VAULT
LOCATION SIMPLE: RIGHT FOREHEAD
LOCATION SIMPLE: RIGHT ELBOW
LOCATION SIMPLE: RIGHT MIDDLE FINGER

## 2025-05-12 NOTE — PROCEDURE: LIQUID NITROGEN
Show Aperture Variable?: Yes
Spray Paint Technique: No
Medical Necessity Information: It is in your best interest to select a reason for this procedure from the list below. All of these items fulfill various CMS LCD requirements except the new and changing color options.
Detail Level: Detailed
Duration Of Freeze Thaw-Cycle (Seconds): 3
Spray Paint Text: The liquid nitrogen was applied to the skin utilizing a spray paint frosting technique.
Post-Care Instructions: I reviewed with the patient in detail post-care instructions. Patient is to wear sunprotection, and avoid picking at any of the treated lesions. Pt may apply Vaseline to crusted or scabbing areas.
Consent: The patient's consent was obtained including but not limited to risks of crusting, scabbing, blistering, scarring, darker or lighter pigmentary change, recurrence, incomplete removal and infection.
Medical Necessity Clause: This procedure was medically necessary because the lesions that were treated were:
Number Of Freeze-Thaw Cycles: 1 freeze-thaw cycle

## 2025-06-12 ENCOUNTER — APPOINTMENT (OUTPATIENT)
Dept: URBAN - METROPOLITAN AREA CLINIC 9 | Facility: CLINIC | Age: 25
Setting detail: DERMATOLOGY
End: 2025-06-12

## 2025-06-12 DIAGNOSIS — B07.8 OTHER VIRAL WARTS: ICD-10-CM | Status: RESOLVED

## 2025-06-12 PROCEDURE — ? COUNSELING

## 2025-06-12 ASSESSMENT — LOCATION DETAILED DESCRIPTION DERM: LOCATION DETAILED: RIGHT MIDDLE FINGER PROXIMAL INTERPHALANGEAL JOINT

## 2025-06-12 ASSESSMENT — LOCATION SIMPLE DESCRIPTION DERM: LOCATION SIMPLE: RIGHT MIDDLE FINGER

## 2025-06-12 ASSESSMENT — LOCATION ZONE DERM: LOCATION ZONE: FINGER
